# Patient Record
Sex: MALE | Race: WHITE | NOT HISPANIC OR LATINO | Employment: FULL TIME | URBAN - METROPOLITAN AREA
[De-identification: names, ages, dates, MRNs, and addresses within clinical notes are randomized per-mention and may not be internally consistent; named-entity substitution may affect disease eponyms.]

---

## 2019-03-25 ENCOUNTER — OFFICE VISIT (OUTPATIENT)
Dept: URGENT CARE | Facility: CLINIC | Age: 39
End: 2019-03-25
Payer: COMMERCIAL

## 2019-03-25 VITALS
DIASTOLIC BLOOD PRESSURE: 88 MMHG | WEIGHT: 232 LBS | OXYGEN SATURATION: 99 % | TEMPERATURE: 98 F | RESPIRATION RATE: 16 BRPM | HEART RATE: 55 BPM | HEIGHT: 74 IN | BODY MASS INDEX: 29.77 KG/M2 | SYSTOLIC BLOOD PRESSURE: 118 MMHG

## 2019-03-25 DIAGNOSIS — J02.9 SORE THROAT: Primary | ICD-10-CM

## 2019-03-25 LAB — S PYO AG THROAT QL: NEGATIVE

## 2019-03-25 PROCEDURE — 99213 OFFICE O/P EST LOW 20 MIN: CPT | Performed by: PHYSICIAN ASSISTANT

## 2019-03-25 RX ORDER — AMOXICILLIN 500 MG/1
500 TABLET, FILM COATED ORAL 3 TIMES DAILY
Qty: 30 TABLET | Refills: 0 | Status: SHIPPED | OUTPATIENT
Start: 2019-03-25 | End: 2019-04-04

## 2019-03-25 NOTE — PROGRESS NOTES
[address] [facility]        NAME: eSth Quigley is a 45 y o  male  : 1980    MRN: [id number]  DATE: 2019  TIME: 9:36 AM    Assessment and Plan   Sore throat [J02 9]  1  Sore throat  amoxicillin (AMOXIL) 500 MG tablet         Patient Instructions      due to exudates  Erythema elbow treat for strep  Follow up with PCP in 3-5 days  Proceed to  ER if symptoms worsen  Chief Complaint     Chief Complaint   Patient presents with    Sore Throat     started Friday    Cough         History of Present Illness         28-year-old male complains of sore throat right greater than left Subjective fever and congestion for 2 days  No vomiting or rashes  No meds over-the-counter for this  Review of Systems   Review of Systems   Constitutional: Positive for chills and fever  HENT: Positive for sore throat  Negative for sinus pressure and sinus pain  Respiratory: Negative for cough and shortness of breath  Cardiovascular: Negative for chest pain  Gastrointestinal: Negative  Current Medications       Current Outpatient Medications:     amoxicillin (AMOXIL) 500 MG tablet, Take 1 tablet (500 mg total) by mouth 3 (three) times a day for 10 days, Disp: 30 tablet, Rfl: 0    Current Allergies     Allergies as of 2019    (No Known Allergies)            The following portions of the patient's history were reviewed and updated as appropriate: allergies, current medications, past family history, past medical history, past social history, past surgical history and problem list      History reviewed  No pertinent past medical history  Past Surgical History:   Procedure Laterality Date    BACK SURGERY         Family History   Problem Relation Age of Onset    No Known Problems Mother     No Known Problems Father          Medications have been verified          Objective   /88   Pulse 55   Temp 98 °F (36 7 °C) (Tympanic)   Resp 16   Ht 6' 2" (1 88 m)   Wt 105 kg (232 lb) SpO2 99%   BMI 29 79 kg/m²        Physical Exam     Physical Exam   Constitutional: He appears well-developed and well-nourished  No distress  HENT:   Right Ear: Tympanic membrane, external ear and ear canal normal    Left Ear: Tympanic membrane, external ear and ear canal normal    Nose: Nose normal  Right sinus exhibits no maxillary sinus tenderness and no frontal sinus tenderness  Left sinus exhibits no maxillary sinus tenderness and no frontal sinus tenderness  Mouth/Throat: Oropharyngeal exudate, posterior oropharyngeal edema and posterior oropharyngeal erythema present  Tonsils are 2+ on the right  Tonsils are 2+ on the left  Tonsillar exudate  Eyes: Pupils are equal, round, and reactive to light  Conjunctivae and EOM are normal  No scleral icterus  Neck: Normal range of motion  Neck supple  Cardiovascular: Normal rate, regular rhythm and normal heart sounds  Pulmonary/Chest: Effort normal and breath sounds normal  No respiratory distress  He has no wheezes  He has no rales  Abdominal: Soft  Bowel sounds are normal  He exhibits no distension and no mass  There is no tenderness  There is no rebound and no guarding  Lymphadenopathy:     He has no cervical adenopathy  Skin: Skin is warm and dry  No rash noted

## 2019-03-25 NOTE — LETTER
March 25, 2019     Patient: Marta Urbina   YOB: 1980   Date of Visit: 3/25/2019       To Whom it May Concern:    Marta Urbina is under my professional care  He was seen in my office on 3/25/2019  He may return to work on 3/26/19  If you have any questions or concerns, please don't hesitate to call           Sincerely,          TORY Corado        CC: No Recipients

## 2020-04-04 ENCOUNTER — OFFICE VISIT (OUTPATIENT)
Dept: URGENT CARE | Facility: CLINIC | Age: 40
End: 2020-04-04
Payer: COMMERCIAL

## 2020-04-04 VITALS
TEMPERATURE: 98.8 F | WEIGHT: 235 LBS | HEART RATE: 82 BPM | OXYGEN SATURATION: 97 % | RESPIRATION RATE: 18 BRPM | BODY MASS INDEX: 30.16 KG/M2 | SYSTOLIC BLOOD PRESSURE: 140 MMHG | DIASTOLIC BLOOD PRESSURE: 90 MMHG | HEIGHT: 74 IN

## 2020-04-04 DIAGNOSIS — J02.0 STREP PHARYNGITIS: Primary | ICD-10-CM

## 2020-04-04 PROCEDURE — 99213 OFFICE O/P EST LOW 20 MIN: CPT | Performed by: PHYSICIAN ASSISTANT

## 2020-04-04 RX ORDER — AMOXICILLIN 875 MG/1
875 TABLET, COATED ORAL 2 TIMES DAILY
Qty: 20 TABLET | Refills: 0 | Status: SHIPPED | OUTPATIENT
Start: 2020-04-04 | End: 2020-04-14

## 2020-04-04 RX ORDER — METHYLPREDNISOLONE 4 MG/1
TABLET ORAL
Qty: 1 EACH | Refills: 0 | Status: SHIPPED | OUTPATIENT
Start: 2020-04-04

## 2023-03-23 ENCOUNTER — HOSPITAL ENCOUNTER (OUTPATIENT)
Dept: RADIOLOGY | Facility: HOSPITAL | Age: 43
End: 2023-03-23

## 2023-03-23 DIAGNOSIS — M54.2 CERVICALGIA: ICD-10-CM

## 2023-03-23 DIAGNOSIS — M54.12 BRACHIAL NEURITIS: ICD-10-CM

## 2024-09-17 ENCOUNTER — HOSPITAL ENCOUNTER (OUTPATIENT)
Dept: RADIOLOGY | Facility: HOSPITAL | Age: 44
Discharge: HOME | End: 2024-09-17
Payer: COMMERCIAL

## 2024-09-17 DIAGNOSIS — M25.621 STIFFNESS OF UPPER ARM JOINT, RIGHT: ICD-10-CM

## 2024-09-17 DIAGNOSIS — M25.521 RIGHT ELBOW PAIN: ICD-10-CM

## 2024-09-17 PROCEDURE — 73080 X-RAY EXAM OF ELBOW: CPT

## 2024-10-22 VITALS
BODY MASS INDEX: 33.8 KG/M2 | OXYGEN SATURATION: 100 % | DIASTOLIC BLOOD PRESSURE: 100 MMHG | WEIGHT: 255 LBS | HEART RATE: 67 BPM | HEIGHT: 73 IN | SYSTOLIC BLOOD PRESSURE: 148 MMHG

## 2024-10-22 DIAGNOSIS — M24.821 ELBOW LOCKING, RIGHT: Primary | ICD-10-CM

## 2024-10-22 PROCEDURE — 99203 OFFICE O/P NEW LOW 30 MIN: CPT | Performed by: FAMILY MEDICINE

## 2024-10-22 NOTE — PATIENT INSTRUCTIONS
Over the counter vitamins:    - Turmeric vitamin at least 1000 mg daily    - Tart cherry vitamin at least 1000 mg daily    - Glucosamine-chondrointin 2-3 times daily      MRI Right elbow

## 2024-10-22 NOTE — PROGRESS NOTES
Assessment/Plan:  Assessment & Plan   Diagnoses and all orders for this visit:    Elbow locking, right  -     MRI elbow right wo contrast; Future  -     Ambulatory Referral to Orthopedic Surgery; Future        44-year-old right hand dominant male with onset of right elbow pain more than 8 months ago.  Discussed with patient physical exam, imaging studies, impression, and plan.  X-rays right elbow noted for mild degenerative changes.  Imaging studies, clinical exam, and history suggest that he has symptoms from pathology within the elbow.  There may be degenerative changes and altered morphology contributing to locking of the elbow.  Symptoms and locking persist despite consider management of NSAIDs, ice and heat, and chiropractic.  At this time I will refer him for MRI of the right elbow to evaluate for cartilage irregularity and loose body as invasive management may be warranted.  I recommend he follow-up with orthopedic upper specialist for further evaluation and recommendation and treatment.        Subjective:   Patient ID: Marcus Zafar is a 44 y.o. male.  Chief Complaint   Patient presents with    Right Elbow - Pain        44-year-old right-hand-dominant male presents for evaluation right elbow pain and locking more than 8 months duration.  He reports onset of symptoms after he was trying to brush snow off the roof of the car.  He suddenly extended his elbow when he felt a pain.  He had pain described as sudden in onset, localized to the posterior medial aspect elbow, twinging, worse with extending the elbow, and improved with resting.  He he started to experience limited range of motion with being unable to fully extend the elbow.  He would treat symptoms with resting, changing position, alternate with ice and heat, and taking NSAIDs.  He started seeing chiropractor and had treatments done however was unable to fully regain range of motion in the shoulder.  He states to have been certain instances in which  "his elbow would lock and he had to manipulate the elbow for it to extend.          Elbow Pain  This is a new problem. The current episode started more than 1 month ago. The problem occurs daily. The problem has been waxing and waning. Associated symptoms include arthralgias. Pertinent negatives include no joint swelling, numbness or weakness. Exacerbated by: Elbow extension. He has tried rest, position changes, NSAIDs, ice and heat (Chiropractic) for the symptoms. The treatment provided mild relief.           The following portions of the patient's history were reviewed and updated as appropriate: He  has no past medical history on file.  He has No Known Allergies..    Review of Systems   Musculoskeletal:  Positive for arthralgias. Negative for joint swelling.   Neurological:  Negative for weakness and numbness.       Objective:  Vitals:    10/22/24 0927   BP: 148/100   Pulse: 67   SpO2: 100%   Weight: 116 kg (255 lb)   Height: 6' 1\" (1.854 m)      Right Hand Exam     Range of Motion   The patient has normal right wrist ROM.     Muscle Strength   Wrist extension: 5/5   Wrist flexion: 5/5   : 5/5     Other   Sensation: normal  Pulse: present      Left Hand Exam     Muscle Strength   Wrist extension: 5/5   Wrist flexion: 5/5   :  5/5     Other   Sensation: normal  Pulse: present      Right Elbow Exam     Tenderness   The patient is experiencing no tenderness.     Range of Motion   Extension:  -10   Flexion:  normal   Pronation:  normal   Supination:  normal     Muscle Strength   The patient has normal right elbow strength (5/5 flexion and extension).    Tests   Varus: negative  Valgus: negative  Tinel's sign (cubital tunnel): negative    Other   Sensation: normal    Comments:  No elbow pain with resisted wrist extension      Left Elbow Exam     Other   Sensation: normal      Right Shoulder Exam     Range of Motion   Active abduction:  normal   Forward flexion:  normal           Strength/Myotome Testing "     Left Wrist/Hand   Wrist extension: 5  Wrist flexion: 5    Right Wrist/Hand   Wrist extension: 5  Wrist flexion: 5      Physical Exam  Vitals and nursing note reviewed.   Constitutional:       General: He is not in acute distress.     Appearance: He is well-developed.   HENT:      Head: Normocephalic and atraumatic.      Right Ear: External ear normal.      Left Ear: External ear normal.   Eyes:      Conjunctiva/sclera: Conjunctivae normal.   Neck:      Trachea: No tracheal deviation.   Cardiovascular:      Rate and Rhythm: Normal rate.   Pulmonary:      Effort: Pulmonary effort is normal. No respiratory distress.   Abdominal:      General: There is no distension.   Musculoskeletal:         General: No tenderness.   Skin:     General: Skin is warm and dry.      Coloration: Skin is not jaundiced or pale.   Neurological:      Mental Status: He is alert and oriented to person, place, and time.   Psychiatric:         Mood and Affect: Mood normal.         Behavior: Behavior normal.         Thought Content: Thought content normal.         Judgment: Judgment normal.         I have personally reviewed pertinent films in PACS and my interpretation is  .  Degenerative changes of the elbow without acute osseous abnormality.    More than 30 minutes spent reviewing patient chart, reviewing and interpreting imaging studies, obtaining history from patient, examining patient, discussing and implementing treatment plan.

## 2024-11-15 ENCOUNTER — HOSPITAL ENCOUNTER (OUTPATIENT)
Dept: MRI IMAGING | Facility: HOSPITAL | Age: 44
End: 2024-11-15
Attending: FAMILY MEDICINE
Payer: COMMERCIAL

## 2024-11-15 DIAGNOSIS — M24.821 ELBOW LOCKING, RIGHT: ICD-10-CM

## 2024-11-15 PROCEDURE — 73221 MRI JOINT UPR EXTREM W/O DYE: CPT

## 2024-11-21 VITALS
SYSTOLIC BLOOD PRESSURE: 138 MMHG | HEIGHT: 73 IN | BODY MASS INDEX: 34.46 KG/M2 | WEIGHT: 260 LBS | DIASTOLIC BLOOD PRESSURE: 76 MMHG

## 2024-11-21 DIAGNOSIS — M24.821 ELBOW LOCKING, RIGHT: ICD-10-CM

## 2024-11-21 PROCEDURE — 99214 OFFICE O/P EST MOD 30 MIN: CPT | Performed by: ORTHOPAEDIC SURGERY

## 2024-11-21 NOTE — PROGRESS NOTES
[facility] [address] [address] [phone]       Marcus Andrade  [id number]  1980    ORTHOPAEDIC SURGERY OUTPATIENT NOTE  11/21/2024      HISTORY:  44 y.o. male presents for initial evaluation of his right elbow.  He reports 8 months or so of right elbow pain and feeling like it is catching.  He has pain with full extension and flexion.  He denies intermittent locking.  No numbness or tingling.  He does have some intermittent medial elbow pain but most of his pain is over the posterior elbow.  No treatment for this.  He did have an MRI.    History reviewed. No pertinent past medical history.    Past Surgical History:   Procedure Laterality Date    BACK SURGERY         Social History     Socioeconomic History    Marital status: /Civil Union     Spouse name: Not on file    Number of children: Not on file    Years of education: Not on file    Highest education level: Not on file   Occupational History    Not on file   Tobacco Use    Smoking status: Former    Smokeless tobacco: Never   Substance and Sexual Activity    Alcohol use: Yes    Drug use: Not Currently    Sexual activity: Not on file   Other Topics Concern    Not on file   Social History Narrative    Not on file     Social Drivers of Health     Financial Resource Strain: Not on file   Food Insecurity: Not on file   Transportation Needs: Not on file   Physical Activity: Not on file   Stress: Not on file   Social Connections: Not on file   Intimate Partner Violence: Not on file   Housing Stability: Not on file       Family History   Problem Relation Age of Onset    No Known Problems Mother     No Known Problems Father         Patient's Medications   New Prescriptions    No medications on file   Previous Medications    METHYLPREDNISOLONE 4 MG TABLET THERAPY PACK    Use as directed on package   Modified Medications    No medications on file   Discontinued  "Medications    No medications on file       No Known Allergies     /76 (BP Location: Left arm, Patient Position: Sitting, Cuff Size: Standard)   Ht 6' 1\" (1.854 m)   Wt 118 kg (260 lb)   BMI 34.30 kg/m²      REVIEW OF SYSTEMS:  Constitutional: Negative.    HEENT: Negative.    Respiratory: Negative.    Skin: Negative.    Neurological: Negative.    Psychiatric/Behavioral: Negative.  Musculoskeletal: Negative except for that mentioned in the HPI.    Gen: No acute distress, resting comfortably in bed  HEENT: Eyes clear, moist mucus membranes, hearing intact  Respiratory: No audible wheezing or stridor  Cardiovascular: Well Perfused peripherally, 2+ distal pulse  Abdomen: nondistended, no peritoneal signs     PHYSICAL EXAM:    Right elbow    Extension 11  Flexion 135  Full supination and pronation    Strength   Flexion 5/5  Extension 5/5  Supination 5/5  Pronation 5/5    Negative Tinels ulnar nerve  TTP at triceps insertion    No pain with  resisted wrist flexion or extension  No pain with 3rd finger extension.       IMAGING:  MRI reviewed, this demonstrates no acute finding, no tendon or ligament injury    XR of the right elbow reviewed, this demonstrates posterior olecranon spur, loose body anterior joint    ASSESSMENT AND PLAN:  44 y.o. male  with right elbow loss of motion, arthritis and likely loose body. We discussed with the patient his imaging and exam finding. We will order a CT scan to evaluate henny morphology for posterior impingement. He may required arthroscopy for loose body removal and osteocapsular arthroplasty. We will see him back after the CT.     Scribe Attestation      I,:  Marcus Stern PA-C am acting as a scribe while in the presence of the attending physician.:       I,:  Lupe Espitia personally performed the services described in this documentation    as scribed in my presence.:             "

## 2024-12-02 ENCOUNTER — HOSPITAL ENCOUNTER (OUTPATIENT)
Dept: CT IMAGING | Facility: HOSPITAL | Age: 44
Discharge: HOME | End: 2024-12-02
Payer: COMMERCIAL

## 2024-12-02 DIAGNOSIS — M24.821 ELBOW LOCKING, RIGHT: ICD-10-CM

## 2024-12-02 PROCEDURE — 73200 CT UPPER EXTREMITY W/O DYE: CPT

## 2024-12-05 VITALS
DIASTOLIC BLOOD PRESSURE: 88 MMHG | SYSTOLIC BLOOD PRESSURE: 140 MMHG | BODY MASS INDEX: 34.46 KG/M2 | HEIGHT: 73 IN | WEIGHT: 260 LBS | HEART RATE: 79 BPM

## 2024-12-05 DIAGNOSIS — M19.029 ELBOW ARTHRITIS: Primary | ICD-10-CM

## 2024-12-05 PROCEDURE — 99214 OFFICE O/P EST MOD 30 MIN: CPT | Performed by: ORTHOPAEDIC SURGERY

## 2024-12-05 RX ORDER — CHLORHEXIDINE GLUCONATE 40 MG/ML
SOLUTION TOPICAL DAILY PRN
OUTPATIENT
Start: 2024-12-05

## 2024-12-05 RX ORDER — CHLORHEXIDINE GLUCONATE ORAL RINSE 1.2 MG/ML
15 SOLUTION DENTAL ONCE
OUTPATIENT
Start: 2024-12-05 | End: 2024-12-05

## 2024-12-05 NOTE — PROGRESS NOTES
"[facility] [address] [address] [phone]       Marcus Zafar  [id number]  1980    ORTHOPAEDIC SURGERY OUTPATIENT NOTE  12/5/2024      HISTORY:  44 y.o. male  presents today follow for his right elbow. He is here to review CT right elbow results. He states he continues to have limited extension and pain in the right elbow. He notes occasional locking and catching.     History reviewed. No pertinent past medical history.    Past Surgical History:   Procedure Laterality Date    BACK SURGERY         Social History     Socioeconomic History    Marital status: /Civil Union     Spouse name: Not on file    Number of children: Not on file    Years of education: Not on file    Highest education level: Not on file   Occupational History    Not on file   Tobacco Use    Smoking status: Former    Smokeless tobacco: Never   Substance and Sexual Activity    Alcohol use: Yes    Drug use: Not Currently    Sexual activity: Not on file   Other Topics Concern    Not on file   Social History Narrative    Not on file     Social Drivers of Health     Financial Resource Strain: Not on file   Food Insecurity: Not on file   Transportation Needs: Not on file   Physical Activity: Not on file   Stress: Not on file   Social Connections: Not on file   Intimate Partner Violence: Not on file   Housing Stability: Not on file       Family History   Problem Relation Age of Onset    No Known Problems Mother     No Known Problems Father         Patient's Medications   New Prescriptions    No medications on file   Previous Medications    METHYLPREDNISOLONE 4 MG TABLET THERAPY PACK    Use as directed on package   Modified Medications    No medications on file   Discontinued Medications    No medications on file       No Known Allergies     /88 (BP Location: Left arm, Patient Position: Sitting, Cuff Size: Standard)   Pulse 79   Ht 6' 1\" " (1.854 m)   Wt 118 kg (260 lb)   BMI 34.30 kg/m²      REVIEW OF SYSTEMS:  Constitutional: Negative.    HEENT: Negative.    Respiratory: Negative.    Skin: Negative.    Neurological: Negative.    Psychiatric/Behavioral: Negative.  Musculoskeletal: Negative except for that mentioned in the HPI.    Gen: No acute distress, resting comfortably in bed  HEENT: Eyes clear, moist mucus membranes, hearing intact  Respiratory: No audible wheezing or stridor  Cardiovascular: Well Perfused peripherally, 2+ distal pulse  Abdomen: nondistended, no peritoneal signs     PHYSICAL EXAM:    Right elbow     Extension 11  Flexion 135  Full supination and pronation     Strength   Flexion 5/5  Extension 5/5  Supination 5/5  Pronation 5/5     Negative Tinels ulnar nerve  TTP at triceps insertion     No pain with  resisted wrist flexion or extension  No pain with 3rd finger extension.    IMAGING:  CT right elbow demonstrates multiple intra-articular loose bodies with in the elbow joint     ASSESSMENT AND PLAN:  44 y.o. male  with right elbow arthritis and mechanical  block  due to multiple intra-articular loose bodies     CT right elbow was reviewed in the office today . Discussed with patient surgery for right elbow osteocapsular arthroplasty. Patient agrees and would like to proceed forward scheduling for surgery. He is to follow up PO Right elbow       The patient understands the risks and benefits of the procedure with risks including pain, stiffness, infection, neurovascular injury, recurrence of symptoms, failure of surgical procedure, inadvertent intraoperative complications, blood loss, blood clots, allergic reaction to anesthesia, stroke, heart attack, all up to and including to death. The patient understood and did consent for surgery today.      Scribe Attestation      I,:  Lulú Murray MA am acting as a scribe while in the presence of the attending physician.:       I,:  Lupe Espitia DO personally performed the  services described in this documentation    as scribed in my presence.:

## 2025-01-16 ENCOUNTER — TELEPHONE (OUTPATIENT)
Age: 45
End: 2025-01-16

## 2025-01-16 NOTE — TELEPHONE ENCOUNTER
Caller: Patient    Doctor: Omkar    Reason for call:     Patient is calling to cancel surgery on 2/7/25  For osteocapsular arthroplasty on right elbow , he is asking to call back about the surgery and rescheduling.    Call back#: [phone]

## 2025-01-16 NOTE — TELEPHONE ENCOUNTER
S/w pt and he wants to completely cancel surgery for now.  He will call back if/when ready to reschedule.

## 2025-05-16 ENCOUNTER — OFFICE VISIT (OUTPATIENT)
Dept: URGENT CARE | Facility: MEDICAL CENTER | Age: 45
End: 2025-05-16
Payer: COMMERCIAL

## 2025-05-16 VITALS
TEMPERATURE: 97.8 F | OXYGEN SATURATION: 98 % | HEART RATE: 70 BPM | BODY MASS INDEX: 34.04 KG/M2 | RESPIRATION RATE: 18 BRPM | DIASTOLIC BLOOD PRESSURE: 90 MMHG | WEIGHT: 258 LBS | SYSTOLIC BLOOD PRESSURE: 130 MMHG

## 2025-05-16 DIAGNOSIS — S61.211A LACERATION OF LEFT INDEX FINGER WITHOUT FOREIGN BODY WITHOUT DAMAGE TO NAIL, INITIAL ENCOUNTER: Primary | ICD-10-CM

## 2025-05-16 DIAGNOSIS — L08.9 FINGER INFECTION: ICD-10-CM

## 2025-05-16 PROCEDURE — 99203 OFFICE O/P NEW LOW 30 MIN: CPT | Performed by: PHYSICIAN ASSISTANT

## 2025-05-16 RX ORDER — MULTIVITAMIN
1 TABLET ORAL DAILY
COMMUNITY

## 2025-05-16 RX ORDER — CEPHALEXIN 500 MG/1
500 CAPSULE ORAL EVERY 8 HOURS SCHEDULED
Qty: 21 CAPSULE | Refills: 0 | Status: SHIPPED | OUTPATIENT
Start: 2025-05-16 | End: 2025-05-23

## 2025-05-16 NOTE — PROGRESS NOTES
Patient Name: Marcus Zafar      : 1980      MRN: [id number]  Encounter Provider: Skye Gray PA-C  Encounter Date: May 16, 2025  Encounter department: [facility]    Assessment & Plan  Laceration of left index finger without foreign body without damage to nail, initial encounter  Updated tetanus vaccination offered to the patient, he declines at this time.  Take oral antibiotic as prescribed.  Advised to wash the hand daily with clean running water.  Try to avoid poking at the cut or trying to squeeze anything out of it.  Orders:    cephalexin (KEFLEX) 500 mg capsule; Take 1 capsule (500 mg total) by mouth every 8 (eight) hours for 7 days    Finger infection    Orders:    cephalexin (KEFLEX) 500 mg capsule; Take 1 capsule (500 mg total) by mouth every 8 (eight) hours for 7 days        Patient Instructions:   Patient Instructions   Wash hands daily with clean running water.  Take antibiotic as prescribed.    Follow up with PCP in 3-5 days.  Proceed to  ER if symptoms worsen.    If tests are performed, our office will contact you with results only if changes need to made to the care plan discussed with you at the visit. You can review your full results on [facility].     Subjective   Chief Complaint   Patient presents with    Finger Pain     Pt. With pain, swelling, redness to his left second digit that began after he got a small cut on his finger a week ago.          Marcus Zafar is a 45 y.o.male  Patient presents for evaluation of pain, swelling, and redness to his left index finger.  He states this started off with a cut about a week ago and has been getting progressively worse.  He is not sure what wound up cutting him, but he was working outside at the time so it could have been anything from plant material, plastic, or even a piece of metal.  He did try poking at it this morning to see if anything were to come out.  Last tetanus was in 2016.        Review of  Systems   Musculoskeletal:  Positive for joint swelling and myalgias.   Skin:  Positive for color change and wound.   All other systems reviewed and are negative.      Current Medications[1]  Allergies as of 05/16/2025    (No Known Allergies)     History reviewed. No pertinent past medical history.  Past Surgical History:   Procedure Laterality Date    BACK SURGERY       Family History   Problem Relation Age of Onset    No Known Problems Mother     No Known Problems Father         Objective   /90   Pulse 70   Temp 97.8 °F (36.6 °C)   Resp 18   Wt 117 kg (258 lb)   SpO2 98%   BMI 34.04 kg/m²      Physical Exam  Vitals and nursing note reviewed.   Constitutional:       Appearance: Normal appearance.     Skin:     General: Skin is warm and dry.      Comments: Left index finger small laceration to the volar aspect at the DIP joint.  Surrounding erythema and swelling.  No tenderness.  No purulent drainage.     Neurological:      General: No focal deficit present.      Mental Status: He is alert and oriented to person, place, and time.     Psychiatric:         Mood and Affect: Mood normal.         Behavior: Behavior normal.            [1]   Current Outpatient Medications:     cephalexin (KEFLEX) 500 mg capsule, Take 1 capsule (500 mg total) by mouth every 8 (eight) hours for 7 days, Disp: 21 capsule, Rfl: 0    Multiple Vitamin (multivitamin) tablet, Take 1 tablet by mouth daily, Disp: , Rfl:     methylPREDNISolone 4 MG tablet therapy pack, Use as directed on package (Patient not taking: Reported on 10/22/2024), Disp: 1 each, Rfl: 0

## 2025-05-16 NOTE — PATIENT INSTRUCTIONS
Wash hands daily with clean running water.  Take antibiotic as prescribed.    Follow up with PCP in 3-5 days.  Proceed to  ER if symptoms worsen.    If tests are performed, our office will contact you with results only if changes need to made to the care plan discussed with you at the visit. You can review your full results on [facility].